# Patient Record
Sex: MALE | Race: WHITE | NOT HISPANIC OR LATINO | Employment: FULL TIME | URBAN - NONMETROPOLITAN AREA
[De-identification: names, ages, dates, MRNs, and addresses within clinical notes are randomized per-mention and may not be internally consistent; named-entity substitution may affect disease eponyms.]

---

## 2023-04-13 ENCOUNTER — OFFICE VISIT (OUTPATIENT)
Dept: FAMILY MEDICINE CLINIC | Facility: CLINIC | Age: 58
End: 2023-04-13
Payer: COMMERCIAL

## 2023-04-13 VITALS
DIASTOLIC BLOOD PRESSURE: 80 MMHG | HEART RATE: 72 BPM | HEIGHT: 65 IN | TEMPERATURE: 98 F | SYSTOLIC BLOOD PRESSURE: 132 MMHG | WEIGHT: 164.2 LBS | OXYGEN SATURATION: 98 % | RESPIRATION RATE: 17 BRPM | BODY MASS INDEX: 27.36 KG/M2

## 2023-04-13 DIAGNOSIS — R19.7 DIARRHEA, UNSPECIFIED TYPE: ICD-10-CM

## 2023-04-13 DIAGNOSIS — R10.10 PAIN OF UPPER ABDOMEN: Primary | ICD-10-CM

## 2023-04-13 PROBLEM — I10 HYPERTENSION: Status: ACTIVE | Noted: 2023-04-13

## 2023-04-13 PROBLEM — T78.40XA ALLERGIES: Status: ACTIVE | Noted: 2023-04-13

## 2023-04-13 PROCEDURE — 99204 OFFICE O/P NEW MOD 45 MIN: CPT

## 2023-04-13 RX ORDER — LISINOPRIL 10 MG/1
20 TABLET ORAL 2 TIMES DAILY
COMMUNITY

## 2023-04-13 RX ORDER — HYDROCHLOROTHIAZIDE 12.5 MG/1
12.5 CAPSULE, GELATIN COATED ORAL DAILY
COMMUNITY

## 2023-04-14 LAB
ALBUMIN SERPL-MCNC: 5.1 G/DL (ref 3.8–4.9)
ALBUMIN/GLOB SERPL: 1.6 {RATIO} (ref 1.2–2.2)
ALP SERPL-CCNC: 64 IU/L (ref 44–121)
ALT SERPL-CCNC: 31 IU/L (ref 0–44)
APPEARANCE UR: CLEAR
AST SERPL-CCNC: 24 IU/L (ref 0–40)
BACTERIA #/AREA URNS HPF: NORMAL /[HPF]
BASOPHILS # BLD AUTO: 0 X10E3/UL (ref 0–0.2)
BASOPHILS NFR BLD AUTO: 0 %
BILIRUB SERPL-MCNC: 0.8 MG/DL (ref 0–1.2)
BILIRUB UR QL STRIP: NEGATIVE
BUN SERPL-MCNC: 16 MG/DL (ref 6–24)
BUN/CREAT SERPL: 15 (ref 9–20)
CALCIUM SERPL-MCNC: 10.2 MG/DL (ref 8.7–10.2)
CASTS URNS QL MICRO: NORMAL /LPF
CHLORIDE SERPL-SCNC: 99 MMOL/L (ref 96–106)
CO2 SERPL-SCNC: 21 MMOL/L (ref 20–29)
COLOR UR: YELLOW
CREAT SERPL-MCNC: 1.09 MG/DL (ref 0.76–1.27)
CRP SERPL-MCNC: 43 MG/L (ref 0–10)
EGFRCR SERPLBLD CKD-EPI 2021: 79 ML/MIN/1.73
EOSINOPHIL # BLD AUTO: 0 X10E3/UL (ref 0–0.4)
EOSINOPHIL NFR BLD AUTO: 0 %
EPI CELLS #/AREA URNS HPF: NORMAL /HPF (ref 0–10)
ERYTHROCYTE [DISTWIDTH] IN BLOOD BY AUTOMATED COUNT: 13 % (ref 11.6–15.4)
ERYTHROCYTE [SEDIMENTATION RATE] IN BLOOD BY WESTERGREN METHOD: 26 MM/HR (ref 0–30)
GLOBULIN SER CALC-MCNC: 3.2 G/DL (ref 1.5–4.5)
GLUCOSE SERPL-MCNC: 96 MG/DL (ref 70–99)
GLUCOSE UR QL STRIP: NEGATIVE
HCT VFR BLD AUTO: 49.5 % (ref 37.5–51)
HGB BLD-MCNC: 17 G/DL (ref 13–17.7)
HGB UR QL STRIP: NEGATIVE
IMM GRANULOCYTES # BLD AUTO: 0 X10E3/UL (ref 0–0.1)
IMM GRANULOCYTES NFR BLD AUTO: 0 %
KETONES UR QL STRIP: ABNORMAL
LEUKOCYTE ESTERASE UR QL STRIP: NEGATIVE
LYMPHOCYTES # BLD AUTO: 0.8 X10E3/UL (ref 0.7–3.1)
LYMPHOCYTES NFR BLD AUTO: 24 %
MCH RBC QN AUTO: 29.7 PG (ref 26.6–33)
MCHC RBC AUTO-ENTMCNC: 34.3 G/DL (ref 31.5–35.7)
MCV RBC AUTO: 87 FL (ref 79–97)
MICRO URNS: ABNORMAL
MONOCYTES # BLD AUTO: 0.8 X10E3/UL (ref 0.1–0.9)
MONOCYTES NFR BLD AUTO: 22 %
MORPHOLOGY BLD-IMP: NORMAL
NEUTROPHILS # BLD AUTO: 1.8 X10E3/UL (ref 1.4–7)
NEUTROPHILS NFR BLD AUTO: 54 %
NITRITE UR QL STRIP: NEGATIVE
PH UR STRIP: 5.5 [PH] (ref 5–7.5)
PLATELET # BLD AUTO: 207 X10E3/UL (ref 150–450)
POTASSIUM SERPL-SCNC: 4.3 MMOL/L (ref 3.5–5.2)
PROT SERPL-MCNC: 8.3 G/DL (ref 6–8.5)
PROT UR QL STRIP: ABNORMAL
RBC # BLD AUTO: 5.72 X10E6/UL (ref 4.14–5.8)
RBC #/AREA URNS HPF: NORMAL /HPF (ref 0–2)
SODIUM SERPL-SCNC: 138 MMOL/L (ref 134–144)
SP GR UR STRIP: >=1.03 (ref 1–1.03)
URINALYSIS REFLEX: ABNORMAL
UROBILINOGEN UR STRIP-MCNC: 0.2 MG/DL (ref 0.2–1)
WBC # BLD AUTO: 3.4 X10E3/UL (ref 3.4–10.8)
WBC #/AREA URNS HPF: NORMAL /HPF (ref 0–5)

## 2023-04-17 LAB — H PYLORI AG STL QL IA: NEGATIVE

## 2023-04-18 LAB
ADV 40+41 DNA STL QL NAA+NON-PROBE: NORMAL
ASTRO TYP 1-8 RNA STL QL NAA+NON-PROBE: NORMAL
C CAYETANENSIS DNA STL QL NAA+NON-PROBE: NORMAL
C COLI+JEJ+UPSA DNA STL QL NAA+NON-PROBE: NORMAL
C DIF TOX TCDA+TCDB STL QL NAA+NON-PROBE: NORMAL
CRYPTOSP DNA STL QL NAA+NON-PROBE: NORMAL
E COLI O157 DNA STL QL NAA+NON-PROBE: NORMAL
E HISTOLYT DNA STL QL NAA+NON-PROBE: NORMAL
EAEC PAA PLAS AGGR+AATA ST NAA+NON-PRB: NORMAL
EC STX1+STX2 GENES STL QL NAA+NON-PROBE: NORMAL
EPEC EAE GENE STL QL NAA+NON-PROBE: NORMAL
ETEC LTA+ST1A+ST1B TOX ST NAA+NON-PROBE: NORMAL
G LAMBLIA DNA STL QL NAA+NON-PROBE: NORMAL
NOROVIRUS GI+II RNA STL QL NAA+NON-PROBE: NORMAL
P SHIGELLOIDES DNA STL QL NAA+NON-PROBE: NORMAL
REQUEST PROBLEM: NORMAL
RVA RNA STL QL NAA+NON-PROBE: NORMAL
S ENT+BONG DNA STL QL NAA+NON-PROBE: NORMAL
SAPO I+II+IV+V RNA STL QL NAA+NON-PROBE: NORMAL
SHIGELLA SP+EIEC IPAH ST NAA+NON-PROBE: NORMAL
V CHOL+PARA+VUL DNA STL QL NAA+NON-PROBE: NORMAL
V CHOLERAE DNA STL QL NAA+NON-PROBE: NORMAL
Y ENTEROCOL DNA STL QL NAA+NON-PROBE: NORMAL

## 2023-10-11 NOTE — PROGRESS NOTES
Male Physical Note      Date: 10/13/2023   Patient Name: Evan Bentley  : 1965   MRN: [id number]     Chief Complaint:    Chief Complaint   Patient presents with    Annual Exam       History of Present Illness: Evan Bentley is a 58 y.o. male who is here today for their annual health maintenance and physical.     His daily medications include HCTZ and lisinopril.  He reports that he has been taking these for years and tolerates them well.  He reports that his blood pressure at home stays in the 120 systolic over 70s or 80s diastolic.  He reports that his blood pressure is always elevated when he comes to a doctor's appointment.  He denies any chronic headaches, facial flushing, chest pain, chest pain with exertion, orthopnea, leg swelling, palpitations, etc.  He denies that he is a snorer.  He is not excessively tired during the day.  He does have a family history of heart disease and hyperlipidemia.    The upper abdominal pain that he was seen about several months ago has fully resolved.  He denies any blood in his stools.  He denies any chronic constipation or diarrhea.  He denies any nausea or vomiting.    He denies urologic, pulmonary, neurologic, and other constitutional symptoms.  Reports that he feels well.      Subjective      Review of Systems:   Review of Systems   Constitutional:  Negative for chills, diaphoresis, fatigue, fever and unexpected weight loss.   HENT:  Negative for trouble swallowing and voice change.    Eyes:  Negative for blurred vision and double vision.   Respiratory:  Negative for cough, shortness of breath and wheezing.    Cardiovascular:  Negative for chest pain, palpitations and leg swelling.   Gastrointestinal:  Negative for abdominal pain, blood in stool, constipation, diarrhea, nausea, vomiting and GERD.   Genitourinary:  Negative for difficulty urinating, dysuria, flank pain, hematuria and nocturia.   Skin:  Negative for rash.   Neurological:  Negative for  dizziness, syncope, weakness, light-headedness and headache.       Past Medical History, Social History, Family History and Care Team were all reviewed with patient and updated as appropriate.     Medications:     Current Outpatient Medications:     hydroCHLOROthiazide (MICROZIDE) 12.5 MG capsule, Take 1 capsule by mouth Daily., Disp: 90 capsule, Rfl: 3    lisinopril (PRINIVIL,ZESTRIL) 20 MG tablet, Take 1 tablet by mouth 2 (Two) Times a Day., Disp: 180 tablet, Rfl: 3    rosuvastatin (Crestor) 20 MG tablet, Take 1 tablet by mouth Daily., Disp: 90 tablet, Rfl: 3    Allergies:   No Known Allergies    Immunizations:  Health Maintenance Summary            Overdue - BMI FOLLOWUP (Yearly) Never done      No completion, postpone, or frequency change history exists for this topic.              Overdue - ZOSTER VACCINE (1 of 2) Never done      No completion, postpone, or frequency change history exists for this topic.              Ordered - HEPATITIS C SCREENING (Once) Ordered on 10/13/2023      No completion, postpone, or frequency change history exists for this topic.              Overdue - COVID-19 Vaccine (5 - 2023-24 season) Overdue since 9/1/2023 09/28/2021  Imm Admin: COVID-19 (MODERNA) 1st,2nd,3rd Dose Monovalent    06/07/2021  Imm Admin: COVID-19 (MODERNA) 1st,2nd,3rd Dose Monovalent    03/17/2021  Imm Admin: COVID-19 (MODERNA) 1st,2nd,3rd Dose Monovalent    02/17/2021  Imm Admin: COVID-19 (MODERNA) 1st,2nd,3rd Dose Monovalent              Postponed - INFLUENZA VACCINE (Yearly - August to March) Postponed until 3/31/2024      10/13/2023  Postponed until 3/31/2024 by Shireen Garza PA-C (Patient Refused)              LIPID PANEL (Yearly) Next due on 7/28/2024 07/28/2023  Done              ANNUAL PHYSICAL (Yearly) Next due on 10/13/2024      10/13/2023  Done              COLORECTAL CANCER SCREENING (COLONOSCOPY - Every 10 Years) Next due on 1/1/2026 01/01/2016  COLONOSCOPY (Patient-Reported  (Performed Externally))              TDAP/TD VACCINES (2 - Td or Tdap) Next due on 10/13/2033      10/13/2023  Imm Admin: Tdap              Pneumococcal Vaccine 0-64 (Series Information) Aged Out      No completion, postpone, or frequency change history exists for this topic.                    Orders Placed This Encounter   Procedures    Tdap Vaccine Greater Than or Equal To 8yo IM       Colorectal Screening:  Last colonoscopy in 2016, patient denies any history of polyps  Last Completed Colonoscopy            COLORECTAL CANCER SCREENING (COLONOSCOPY - Every 10 Years) Next due on 1/1/2026 01/01/2016  COLONOSCOPY (Patient-Reported (Performed Externally))                  CT for Smoker (Age 50-80, 20pk yr within last 15 years): N/A  Bone Density/DEXA (high risk): N/A  Hep C (Age 18-79 once): Ordered today  HIV (Age 15-65 once) : Ordered today  PSA (Over age 50, C Level Recommendation): Ordered today  US Aorta (For male smokers, age 65): N/A  A1c: 5.6 in July 2023  Lipid panel: Ordered today    The ASCVD Risk score (Annie DK, et al., 2019) failed to calculate for the following reasons:    Cannot find a previous HDL lab    Cannot find a previous total cholesterol lab    Dermatology: Up to date, Dr. Beard in [place]  Optometrist: Up to date, Dr. 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    Smoking Cessation:   N/A     Objective     Physical Exam:  Vital Signs:   Vitals:    10/13/23 0823 10/13/23 0907   BP: 160/88 158/90   BP Location: Left arm Right arm   Patient Position: Sitting Sitting   Cuff Size: Adult Adult   Pulse: 65    Resp: 16    Temp: 99.6 øF (37.6 øC)    TempSrc: Temporal    SpO2: 99%    Weight: 78.9 kg (174 lb)    Height: 165.1 cm (65\")      Body mass index is 28.96 kg/mý.     Physical Exam  Vitals and nursing note reviewed.   Constitutional:       General: He is not in acute distress.     Appearance: Normal appearance. He is not ill-appearing, toxic-appearing or diaphoretic.   HENT:      Head: Normocephalic and atraumatic.   Eyes:      Extraocular Movements: Extraocular movements intact.   Neck:      Vascular: No carotid bruit.      Comments: No thyromegaly or masses appreciated or palpated  Cardiovascular:      Rate and Rhythm: Normal rate and regular rhythm.      Heart sounds: No murmur heard.     No friction rub. No gallop.   Pulmonary:      Effort: Pulmonary effort is normal. No respiratory distress.      Breath sounds: No wheezing, rhonchi or rales.   Musculoskeletal:      Cervical back: Normal range of motion.      Right lower leg: No edema.      Left lower leg: No edema.   Lymphadenopathy:      Cervical: No cervical adenopathy.   Skin:     Coloration: Skin is not pale.   Neurological:      Mental Status: He is alert and oriented to person, place, and time. Mental status is at baseline.   Psychiatric:         Mood and Affect: Mood normal.         Thought Content: Thought content normal.            Assessment / Plan      Assessment/Plan:   Diagnoses and all orders for this visit:    1. Primary hypertension (Primary)  -Blood pressure is elevated in the office today.  However, patient maintains that his blood pressure is always in acceptable range at home.  He " does bring outside labs, which shows that his blood pressure was 119/78 in July of this year at the time of service.  -We will not make dose adjustments today based on elevated readings in the office alone.  He has been advised to keep a log of his blood pressure readings and [employer] message me these results after monitoring for 1 to 2 weeks.  -Refills of the hydrochlorothiazide and lisinopril at current doses have been sent to the pharmacy.  -     hydroCHLOROthiazide (MICROZIDE) 12.5 MG capsule; Take 1 capsule by mouth Daily.  Dispense: 90 capsule; Refill: 3  -     lisinopril (PRINIVIL,ZESTRIL) 20 MG tablet; Take 1 tablet by mouth 2 (Two) Times a Day.  Dispense: 180 tablet; Refill: 3    2. Routine general medical examination at a health care facility  -Yearly health maintenance labs have been ordered.  -PHQ-2 Total Score: 0  -Discussed the importance of yearly optometry, dermatology, and twice yearly dental exams.  Discussed the importance of sunscreen for skin cancer prevention.  Discussed immunizations he is eligible for and preventative healthcare screenings.  -Did discuss that his BMI does put him in the overweight range.  Did discuss the importance of a well-balanced diet, decreasing fried and fatty food intake, and trying to increase physical activity, ideally 150 minutes of aerobic activity weekly.  -     Hepatitis C Antibody; Future  -     CBC (No Diff); Future  -     Comprehensive Metabolic Panel; Future  -     Vitamin B12; Future  -     TSH Rfx On Abnormal To Free T4; Future  -     Urinalysis With Culture If Indicated -; Future  -     Testosterone; Future  -     Hepatitis C Antibody  -     CBC (No Diff)  -     Comprehensive Metabolic Panel  -     Vitamin B12  -     TSH Rfx On Abnormal To Free T4  -     Urinalysis With Culture If Indicated -  -     Testosterone    3. Need for Tdap vaccination  -It has been greater than 10 years since his last Tdap vaccination.  Did discuss purpose of Tdap vaccine and  with/benefits.  He is agreeable to vaccination today.  -     Tdap Vaccine Greater Than or Equal To 8yo IM    4. Need for shingles vaccine  -Discussed purpose of shingles vaccination.  Discussed risks and benefits.  We do not carry shingles vaccines in the office, but he has been encouraged to inquire about this at local pharmacy.    5. Need for influenza vaccination  -Discussed risks and benefits of influenza vaccination.  Patient declines vaccination at present time.    6. Encounter for screening for HIV  -One-time HIV screening has been ordered in accordance with national guidelines.  -     HIV-1/O/2 Ag/Ab w Reflex; Future  -     HIV-1/O/2 Ag/Ab w Reflex    7. Need for hepatitis C screening test  -One-time hepatitis C screening antibody has been ordered in accordance with national guidelines.    8. Prostate cancer screening  -Yearly screening PSA has been ordered.  He does not display symptoms of nocturia, difficulty starting or stopping stream of urine, or urinary hesitancy.  -     PSA Screen; Future  -     PSA Screen    9. Mixed hyperlipidemia  -From his most recent lipid panel in July, his 10-year ASCVD risk = 9.2%.  We did discuss benefits and recommendation of initiating statin for any patient whose ASCVD risk is greater than 7.5%.  -We did discuss possible side effects and consequences such as rhabdomyolysis, myalgias, elevation of LFTs, etc.  We did discuss benefits of medication such as preventing cardiovascular events.  -Patient is agreeable to starting medication.  We did discuss that we will need to recheck his liver enzymes in 2 to 3 weeks to ensure there is no elevation.  If he has any intolerance to medication such as myalgias, he has been encouraged to return to care.  -We will plan to recheck his lipid panel in 2 months after medication initiation.  -     rosuvastatin (Crestor) 20 MG tablet; Take 1 tablet by mouth Daily.  Dispense: 90 tablet; Refill: 3         Healthcare Maintenance:  Counseling  provided based on age appropriate USPSTF guidelines.  BMI is >= 25 and <30. (Overweight) The following options were offered after discussion;: exercise counseling/recommendations and nutrition counseling/recommendations    Evan Bentley voices understanding and acceptance of this advice and will call back with any further questions or concerns. AVS with preventive healthcare tips printed for patient.     Follow Up:   Return in about 1 month (around 11/13/2023) for Recheck.    At [facility], we believe that sharing information builds trust and better relationships. You are receiving this note because you recently visited [facility]. It is possible you will see health information before a provider has talked with you about it. This kind of information can be easy to misunderstand. To help you fully understand what it means for your health, we urge you to discuss this note with your provider.    Patricia Garza PA-C  [facility] [place] [facility] [place]

## 2023-10-13 ENCOUNTER — OFFICE VISIT (OUTPATIENT)
Dept: FAMILY MEDICINE CLINIC | Facility: CLINIC | Age: 58
End: 2023-10-13
Payer: COMMERCIAL

## 2023-10-13 VITALS
HEIGHT: 65 IN | SYSTOLIC BLOOD PRESSURE: 158 MMHG | BODY MASS INDEX: 28.99 KG/M2 | HEART RATE: 65 BPM | WEIGHT: 174 LBS | OXYGEN SATURATION: 99 % | TEMPERATURE: 99.6 F | RESPIRATION RATE: 16 BRPM | DIASTOLIC BLOOD PRESSURE: 90 MMHG

## 2023-10-13 DIAGNOSIS — Z11.4 ENCOUNTER FOR SCREENING FOR HIV: ICD-10-CM

## 2023-10-13 DIAGNOSIS — Z00.00 ROUTINE GENERAL MEDICAL EXAMINATION AT A HEALTH CARE FACILITY: ICD-10-CM

## 2023-10-13 DIAGNOSIS — Z12.5 PROSTATE CANCER SCREENING: ICD-10-CM

## 2023-10-13 DIAGNOSIS — Z23 NEED FOR TDAP VACCINATION: ICD-10-CM

## 2023-10-13 DIAGNOSIS — Z23 NEED FOR INFLUENZA VACCINATION: ICD-10-CM

## 2023-10-13 DIAGNOSIS — Z23 NEED FOR SHINGLES VACCINE: ICD-10-CM

## 2023-10-13 DIAGNOSIS — E78.2 MIXED HYPERLIPIDEMIA: ICD-10-CM

## 2023-10-13 DIAGNOSIS — I10 PRIMARY HYPERTENSION: Primary | ICD-10-CM

## 2023-10-13 DIAGNOSIS — Z11.59 NEED FOR HEPATITIS C SCREENING TEST: ICD-10-CM

## 2023-10-13 RX ORDER — LISINOPRIL 20 MG/1
20 TABLET ORAL 2 TIMES DAILY
Qty: 180 TABLET | Refills: 3 | Status: SHIPPED | OUTPATIENT
Start: 2023-10-13

## 2023-10-13 RX ORDER — HYDROCHLOROTHIAZIDE 12.5 MG/1
12.5 CAPSULE, GELATIN COATED ORAL DAILY
Qty: 90 CAPSULE | Refills: 3 | Status: SHIPPED | OUTPATIENT
Start: 2023-10-13

## 2023-10-13 RX ORDER — ROSUVASTATIN CALCIUM 20 MG/1
20 TABLET, COATED ORAL DAILY
Qty: 90 TABLET | Refills: 3 | Status: SHIPPED | OUTPATIENT
Start: 2023-10-13

## 2023-10-14 LAB
ALBUMIN SERPL-MCNC: 5.1 G/DL (ref 3.8–4.9)
ALBUMIN/GLOB SERPL: 1.8 {RATIO} (ref 1.2–2.2)
ALP SERPL-CCNC: 71 IU/L (ref 44–121)
ALT SERPL-CCNC: 27 IU/L (ref 0–44)
APPEARANCE UR: CLEAR
AST SERPL-CCNC: 20 IU/L (ref 0–40)
BACTERIA #/AREA URNS HPF: NORMAL /[HPF]
BILIRUB SERPL-MCNC: 0.5 MG/DL (ref 0–1.2)
BILIRUB UR QL STRIP: NEGATIVE
BUN SERPL-MCNC: 19 MG/DL (ref 6–24)
BUN/CREAT SERPL: 19 (ref 9–20)
CALCIUM SERPL-MCNC: 9.9 MG/DL (ref 8.7–10.2)
CASTS URNS QL MICRO: NORMAL /LPF
CHLORIDE SERPL-SCNC: 101 MMOL/L (ref 96–106)
CO2 SERPL-SCNC: 22 MMOL/L (ref 20–29)
COLOR UR: YELLOW
CREAT SERPL-MCNC: 1 MG/DL (ref 0.76–1.27)
EGFRCR SERPLBLD CKD-EPI 2021: 87 ML/MIN/1.73
EPI CELLS #/AREA URNS HPF: NORMAL /HPF (ref 0–10)
ERYTHROCYTE [DISTWIDTH] IN BLOOD BY AUTOMATED COUNT: 13.2 % (ref 11.6–15.4)
GLOBULIN SER CALC-MCNC: 2.9 G/DL (ref 1.5–4.5)
GLUCOSE SERPL-MCNC: 99 MG/DL (ref 70–99)
GLUCOSE UR QL STRIP: NEGATIVE
HCT VFR BLD AUTO: 46.6 % (ref 37.5–51)
HGB BLD-MCNC: 15.6 G/DL (ref 13–17.7)
HGB UR QL STRIP: NEGATIVE
HIV 1+2 AB+HIV1 P24 AG SERPL QL IA: NON REACTIVE
KETONES UR QL STRIP: NEGATIVE
LEUKOCYTE ESTERASE UR QL STRIP: NEGATIVE
MCH RBC QN AUTO: 29.9 PG (ref 26.6–33)
MCHC RBC AUTO-ENTMCNC: 33.5 G/DL (ref 31.5–35.7)
MCV RBC AUTO: 89 FL (ref 79–97)
MICRO URNS: NORMAL
MICRO URNS: NORMAL
NITRITE UR QL STRIP: NEGATIVE
PH UR STRIP: 6 [PH] (ref 5–7.5)
PLATELET # BLD AUTO: 207 X10E3/UL (ref 150–450)
POTASSIUM SERPL-SCNC: 4.5 MMOL/L (ref 3.5–5.2)
PROT SERPL-MCNC: 8 G/DL (ref 6–8.5)
PROT UR QL STRIP: NEGATIVE
PSA SERPL-MCNC: 2.8 NG/ML (ref 0–4)
RBC # BLD AUTO: 5.22 X10E6/UL (ref 4.14–5.8)
RBC #/AREA URNS HPF: NORMAL /HPF (ref 0–2)
SODIUM SERPL-SCNC: 141 MMOL/L (ref 134–144)
SP GR UR STRIP: 1.02 (ref 1–1.03)
TESTOST SERPL-MCNC: 388 NG/DL (ref 264–916)
TSH SERPL DL<=0.005 MIU/L-ACNC: 1.2 UIU/ML (ref 0.45–4.5)
URINALYSIS REFLEX: NORMAL
UROBILINOGEN UR STRIP-MCNC: 1 MG/DL (ref 0.2–1)
VIT B12 SERPL-MCNC: 202 PG/ML (ref 232–1245)
WBC # BLD AUTO: 4.6 X10E3/UL (ref 3.4–10.8)
WBC #/AREA URNS HPF: NORMAL /HPF (ref 0–5)

## 2023-10-16 ENCOUNTER — TELEPHONE (OUTPATIENT)
Dept: FAMILY MEDICINE CLINIC | Facility: CLINIC | Age: 58
End: 2023-10-16
Payer: COMMERCIAL

## 2023-10-16 NOTE — TELEPHONE ENCOUNTER
----- Message from Shireen Garza PA-C sent at 10/15/2023 12:50 PM EDT -----  Vitamin B12 is low.  I would recommend vitamin B-12 injections weekly.  He can do this at the office or I can send a prescription to his pharmacy and he can do these injections at home.  Just let me know what he prefers.  We will need to recheck his vitamin B12 levels after 8 weeks.  All other labs are largely within normal limits and have no abnormalities to address at present time.

## 2023-10-19 DIAGNOSIS — E53.8 VITAMIN B12 DEFICIENCY: Primary | ICD-10-CM

## 2023-10-19 RX ORDER — CYANOCOBALAMIN 1000 UG/ML
1000 INJECTION, SOLUTION INTRAMUSCULAR; SUBCUTANEOUS WEEKLY
Qty: 12 ML | Refills: 0 | Status: SHIPPED | OUTPATIENT
Start: 2023-10-19

## 2024-04-26 RX ORDER — IBUPROFEN 800 MG/1
800 TABLET ORAL EVERY 8 HOURS PRN
Qty: 60 TABLET | Refills: 2 | Status: SHIPPED | OUTPATIENT
Start: 2024-04-26

## 2024-09-13 DIAGNOSIS — I10 PRIMARY HYPERTENSION: ICD-10-CM

## 2024-09-13 RX ORDER — LISINOPRIL 20 MG/1
20 TABLET ORAL 2 TIMES DAILY
Qty: 180 TABLET | Refills: 1 | Status: SHIPPED | OUTPATIENT
Start: 2024-09-13

## 2024-09-13 RX ORDER — HYDROCHLOROTHIAZIDE 12.5 MG/1
12.5 CAPSULE ORAL DAILY
Qty: 90 CAPSULE | Refills: 1 | Status: SHIPPED | OUTPATIENT
Start: 2024-09-13